# Patient Record
Sex: FEMALE | URBAN - METROPOLITAN AREA
[De-identification: names, ages, dates, MRNs, and addresses within clinical notes are randomized per-mention and may not be internally consistent; named-entity substitution may affect disease eponyms.]

---

## 2024-08-27 ENCOUNTER — APPOINTMENT (RX ONLY)
Dept: URBAN - METROPOLITAN AREA CLINIC 26 | Facility: CLINIC | Age: 49
Setting detail: DERMATOLOGY
End: 2024-08-27

## 2024-08-27 DIAGNOSIS — Z41.9 ENCOUNTER FOR PROCEDURE FOR PURPOSES OTHER THAN REMEDYING HEALTH STATE, UNSPECIFIED: ICD-10-CM

## 2024-08-27 PROCEDURE — ? HYDRAFACIAL

## 2024-08-27 PROCEDURE — ? MEDICAL CONSULTATION: PRODUCTS

## 2024-08-27 NOTE — PROCEDURE: HYDRAFACIAL
Treatment Number: 1
Vacuum Pressure High Setting (Will Not Render If Set To 0): 0
Indication: anti-aging
Solution: Beta-HD
Tip: Hydropeel Tip, Clear
Tip: Hydropeel Tip, Blue
Tip: Hydropeel Tip, Teal
Consent: Written consent obtained, risks reviewed including but not limited to crusting, scabbing, blistering, scarring, darker or lighter pigmentary change, bruising, and/or incomplete response.
Solution Override
Price (Use Numbers Only, No Special Characters Or $): 199
Procedure: Exfoliation
Location: face
Post-Care Instructions: I reviewed with the patient in detail post-care instructions. Patient should stay away from the sun and wear sun protection until treated areas are fully healed.
Tip Override
Procedure: Boost
Procedure: Extend and Protect
Solution: Activ-4
Solution: GlySal 7.5%
Procedure: Extraction
Procedure: Peel
Procedure: Fusion